# Patient Record
Sex: FEMALE | Race: WHITE | NOT HISPANIC OR LATINO | Employment: FULL TIME | URBAN - METROPOLITAN AREA
[De-identification: names, ages, dates, MRNs, and addresses within clinical notes are randomized per-mention and may not be internally consistent; named-entity substitution may affect disease eponyms.]

---

## 2019-12-15 ENCOUNTER — HOSPITAL ENCOUNTER (EMERGENCY)
Facility: HOSPITAL | Age: 34
Discharge: HOME | End: 2019-12-15
Admitting: EMERGENCY MEDICINE

## 2019-12-15 VITALS
OXYGEN SATURATION: 100 % | TEMPERATURE: 98.1 F | SYSTOLIC BLOOD PRESSURE: 114 MMHG | RESPIRATION RATE: 18 BRPM | HEART RATE: 72 BPM | WEIGHT: 178.57 LBS | HEIGHT: 67 IN | BODY MASS INDEX: 28.03 KG/M2 | DIASTOLIC BLOOD PRESSURE: 78 MMHG

## 2019-12-15 DIAGNOSIS — S61.412A LACERATION OF LEFT HAND WITHOUT FOREIGN BODY, INITIAL ENCOUNTER: ICD-10-CM
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
## 2019-12-15 PROCEDURE — 25010000002 TDAP 5-2.5-18.5 LF-MCG/0.5 SUSPENSION: Performed by: NURSE PRACTITIONER

## 2019-12-15 PROCEDURE — 25010000003 LIDOCAINE 1 % SOLUTION

## 2019-12-15 PROCEDURE — 90715 TDAP VACCINE 7 YRS/> IM: CPT | Performed by: NURSE PRACTITIONER

## 2019-12-15 PROCEDURE — 90471 IMMUNIZATION ADMIN: CPT | Performed by: NURSE PRACTITIONER

## 2019-12-15 PROCEDURE — 99283 EMERGENCY DEPT VISIT LOW MDM: CPT

## 2019-12-15 RX ORDER — IBUPROFEN 800 MG/1
800 TABLET ORAL EVERY 8 HOURS PRN
Qty: 9 TABLET | Refills: 0 | Status: SHIPPED | OUTPATIENT
Start: 2019-12-15

## 2019-12-15 RX ORDER — AMOXICILLIN AND CLAVULANATE POTASSIUM 875; 125 MG/1; MG/1
1 TABLET, FILM COATED ORAL 2 TIMES DAILY
Qty: 14 TABLET | Refills: 0 | Status: SHIPPED | OUTPATIENT
Start: 2019-12-15

## 2019-12-15 RX ADMIN — TETANUS TOXOID, REDUCED DIPHTHERIA TOXOID AND ACELLULAR PERTUSSIS VACCINE, ADSORBED 0.5 ML: 5; 2.5; 8; 8; 2.5 SUSPENSION INTRAMUSCULAR at 23:01

## 2019-12-16 PROCEDURE — 12042 INTMD RPR N-HF/GENIT2.6-7.5: CPT | Performed by: NURSE PRACTITIONER

## 2019-12-16 NOTE — ED NOTES
Broke up family dogs during a fight, laceration to left palm noted     Zabrina Coronel, RN  12/15/19 2125

## 2019-12-16 NOTE — ED PROVIDER NOTES
Subjective   Chief complaint: Laceration      Context: Patient is a 34-year-old female who comes in with her significant other complaining of a laceration to her left hand after she tried to intervene when her dogs were fighting.  She states they are up-to-date on their immunizations.  She has not had a tetanus shot last 5 years.  She denies any numbness or tingling.  She denies any other injuries from the incident.    Duration: shortly pta    Timing: waxes and wanes    Severity: moderate    Associated symptoms:  Worse with rom        PCP: anita      LNMP:12/1            Review of Systems   Constitutional: Negative.    HENT: Negative.    Respiratory: Negative.    Cardiovascular: Negative.    Gastrointestinal: Negative.    Musculoskeletal: Positive for myalgias.   Skin: Positive for wound.   Neurological: Negative.    Psychiatric/Behavioral: The patient is nervous/anxious.        Past Medical History:   Diagnosis Date   • Anxiety        Allergies   Allergen Reactions   • Contrast Dye Anaphylaxis   • Shellfish-Derived Products Hives       Past Surgical History:   Procedure Laterality Date   • FRACTURE SURGERY         History reviewed. No pertinent family history.    Social History     Socioeconomic History   • Marital status:      Spouse name: Not on file   • Number of children: Not on file   • Years of education: Not on file   • Highest education level: Not on file   Tobacco Use   • Smoking status: Never Smoker   • Smokeless tobacco: Never Used   Substance and Sexual Activity   • Alcohol use: Yes     Alcohol/week: 1.0 standard drinks     Types: 1 Glasses of wine per week     Comment: nightly.   • Drug use: Never           Objective   Physical Exam     Vital signs and traige nurse note reviewed.  Constitutional:  Awake, alert; well developed and well nourished.  No acute distress, the patient is examined in hospital gown.  HEENT:  Normocephalic, atraumatic;  with intact EOM; oropharynx is pink and moist  without edema or erythema.  Neck: Supple, full range of motion without pain;   Cardiovascular: Regular rate and rhythm,    Pulmonary: Respiratory effort regular, nonlabored;   Musculoskeletal: 4.5 cm jagged laceration noted to the palmar aspect of the left hand without any underlying swelling.  Good strength with flexion extension distally.  Distal resting sensorimotor intact.  Full range of motion of the wrist.  Scant active bleeding.  Neuro: Alert oriented x3, speech is clear and appropriate.        Laceration Repair  Date/Time: 12/16/2019 12:11 AM  Performed by: Rita Ventura APRN  Authorized by: Rita Ventura APRN     Consent:     Consent obtained:  Verbal    Consent given by:  Patient and spouse    Risks discussed:  Pain, infection, need for additional repair and poor cosmetic result    Alternatives discussed:  No treatment, delayed treatment, observation and referral  Anesthesia (see MAR for exact dosages):     Anesthesia method:  Local infiltration    Local anesthetic:  Lidocaine 1% w/o epi (3 cc)  Laceration details:     Location:  Hand    Hand location:  L palm    Length (cm):  4.5  Repair type:     Repair type:  Intermediate  Pre-procedure details:     Preparation:  Patient was prepped and draped in usual sterile fashion  Exploration:     Wound exploration: wound explored through full range of motion    Treatment:     Area cleansed with:  Hibiclens    Amount of cleaning:  Extensive    Irrigation solution:  Sterile saline  Skin repair:     Repair method:  Sutures    Suture size:  5-0    Suture material:  Nylon    Suture technique:  Simple interrupted  Approximation:     Approximation:  Loose  Post-procedure details:     Dressing:  Antibiotic ointment, splint for protection and non-adherent dressing    Patient tolerance of procedure:  Tolerated with difficulty  Comments:      Patient was extremely anxious, spouse was at bedside               ED Course                       No data recorded                         MDM  Number of Diagnoses or Management Options  Dog bite, initial encounter:   Laceration of left hand without foreign body, initial encounter:   Diagnosis management comments: Medical decision  Comorbidities:  has a past medical history of Anxiety.  Differentials: Muscle tendon injury    i discussed findings with patient who voices understanding of discharge instructions, signs and symptoms requiring return to ED; discharged improved and in stable condition with follow up for re-evaluation.  We discussed the importance of follow-up with the orthopedist or hand specialist.  Patient was discharged home with him Augmentin and ibuprofen    Patient Progress  Patient progress: improved      Final diagnoses:   Dog bite, initial encounter   Laceration of left hand without foreign body, initial encounter              Rita Ventura, APRN  12/16/19 0012